# Patient Record
Sex: FEMALE | Race: ASIAN | NOT HISPANIC OR LATINO | URBAN - METROPOLITAN AREA
[De-identification: names, ages, dates, MRNs, and addresses within clinical notes are randomized per-mention and may not be internally consistent; named-entity substitution may affect disease eponyms.]

---

## 2017-06-23 ENCOUNTER — INPATIENT (INPATIENT)
Facility: HOSPITAL | Age: 47
LOS: 1 days | Discharge: HOME | DRG: 690 | End: 2017-06-25
Attending: INTERNAL MEDICINE | Admitting: INTERNAL MEDICINE
Payer: COMMERCIAL

## 2017-06-23 VITALS
SYSTOLIC BLOOD PRESSURE: 112 MMHG | TEMPERATURE: 100 F | OXYGEN SATURATION: 96 % | HEART RATE: 106 BPM | RESPIRATION RATE: 20 BRPM | DIASTOLIC BLOOD PRESSURE: 75 MMHG

## 2017-06-23 LAB
ALBUMIN SERPL ELPH-MCNC: 4.4 G/DL — SIGNIFICANT CHANGE UP (ref 3.3–5)
ALP SERPL-CCNC: 45 U/L — SIGNIFICANT CHANGE UP (ref 40–120)
ALT FLD-CCNC: 16 U/L RC — SIGNIFICANT CHANGE UP (ref 10–45)
ANION GAP SERPL CALC-SCNC: 14 MMOL/L — SIGNIFICANT CHANGE UP (ref 5–17)
AST SERPL-CCNC: 23 U/L — SIGNIFICANT CHANGE UP (ref 10–40)
BASOPHILS # BLD AUTO: 0 K/UL — SIGNIFICANT CHANGE UP (ref 0–0.2)
BASOPHILS NFR BLD AUTO: 0.3 % — SIGNIFICANT CHANGE UP (ref 0–2)
BILIRUB SERPL-MCNC: 0.8 MG/DL — SIGNIFICANT CHANGE UP (ref 0.2–1.2)
BUN SERPL-MCNC: 7 MG/DL — SIGNIFICANT CHANGE UP (ref 7–23)
CALCIUM SERPL-MCNC: 9.6 MG/DL — SIGNIFICANT CHANGE UP (ref 8.4–10.5)
CHLORIDE SERPL-SCNC: 99 MMOL/L — SIGNIFICANT CHANGE UP (ref 96–108)
CO2 SERPL-SCNC: 25 MMOL/L — SIGNIFICANT CHANGE UP (ref 22–31)
CREAT SERPL-MCNC: 0.78 MG/DL — SIGNIFICANT CHANGE UP (ref 0.5–1.3)
EOSINOPHIL # BLD AUTO: 0 K/UL — SIGNIFICANT CHANGE UP (ref 0–0.5)
EOSINOPHIL NFR BLD AUTO: 0.3 % — SIGNIFICANT CHANGE UP (ref 0–6)
GAS PNL BLDV: SIGNIFICANT CHANGE UP
GLUCOSE SERPL-MCNC: 100 MG/DL — HIGH (ref 70–99)
HCG UR QL: NEGATIVE — SIGNIFICANT CHANGE UP
HCT VFR BLD CALC: 40.1 % — SIGNIFICANT CHANGE UP (ref 34.5–45)
HGB BLD-MCNC: 13.5 G/DL — SIGNIFICANT CHANGE UP (ref 11.5–15.5)
LIDOCAIN IGE QN: 27 U/L — SIGNIFICANT CHANGE UP (ref 7–60)
LYMPHOCYTES # BLD AUTO: 1 K/UL — SIGNIFICANT CHANGE UP (ref 1–3.3)
LYMPHOCYTES # BLD AUTO: 14.6 % — SIGNIFICANT CHANGE UP (ref 13–44)
MCHC RBC-ENTMCNC: 29.9 PG — SIGNIFICANT CHANGE UP (ref 27–34)
MCHC RBC-ENTMCNC: 33.5 GM/DL — SIGNIFICANT CHANGE UP (ref 32–36)
MCV RBC AUTO: 89.1 FL — SIGNIFICANT CHANGE UP (ref 80–100)
MONOCYTES # BLD AUTO: 0.4 K/UL — SIGNIFICANT CHANGE UP (ref 0–0.9)
MONOCYTES NFR BLD AUTO: 5.7 % — SIGNIFICANT CHANGE UP (ref 2–14)
NEUTROPHILS # BLD AUTO: 5.4 K/UL — SIGNIFICANT CHANGE UP (ref 1.8–7.4)
NEUTROPHILS NFR BLD AUTO: 79.2 % — HIGH (ref 43–77)
PLATELET # BLD AUTO: 144 K/UL — LOW (ref 150–400)
POTASSIUM SERPL-MCNC: 3.4 MMOL/L — LOW (ref 3.5–5.3)
POTASSIUM SERPL-SCNC: 3.4 MMOL/L — LOW (ref 3.5–5.3)
PROT SERPL-MCNC: 7.7 G/DL — SIGNIFICANT CHANGE UP (ref 6–8.3)
RBC # BLD: 4.5 M/UL — SIGNIFICANT CHANGE UP (ref 3.8–5.2)
RBC # FLD: 11.7 % — SIGNIFICANT CHANGE UP (ref 10.3–14.5)
SODIUM SERPL-SCNC: 138 MMOL/L — SIGNIFICANT CHANGE UP (ref 135–145)
WBC # BLD: 6.8 K/UL — SIGNIFICANT CHANGE UP (ref 3.8–10.5)
WBC # FLD AUTO: 6.8 K/UL — SIGNIFICANT CHANGE UP (ref 3.8–10.5)

## 2017-06-23 PROCEDURE — 71020: CPT | Mod: 26

## 2017-06-23 PROCEDURE — 74177 CT ABD & PELVIS W/CONTRAST: CPT | Mod: 26

## 2017-06-23 PROCEDURE — 99285 EMERGENCY DEPT VISIT HI MDM: CPT

## 2017-06-23 RX ORDER — SODIUM CHLORIDE 9 MG/ML
1000 INJECTION INTRAMUSCULAR; INTRAVENOUS; SUBCUTANEOUS ONCE
Qty: 0 | Refills: 0 | Status: COMPLETED | OUTPATIENT
Start: 2017-06-23 | End: 2017-06-23

## 2017-06-23 RX ORDER — ACETAMINOPHEN 500 MG
1000 TABLET ORAL ONCE
Qty: 0 | Refills: 0 | Status: COMPLETED | OUTPATIENT
Start: 2017-06-23 | End: 2017-06-23

## 2017-06-23 RX ORDER — CEFTRIAXONE 500 MG/1
1 INJECTION, POWDER, FOR SOLUTION INTRAMUSCULAR; INTRAVENOUS ONCE
Qty: 0 | Refills: 0 | Status: COMPLETED | OUTPATIENT
Start: 2017-06-23 | End: 2017-06-23

## 2017-06-23 RX ADMIN — CEFTRIAXONE 100 GRAM(S): 500 INJECTION, POWDER, FOR SOLUTION INTRAMUSCULAR; INTRAVENOUS at 23:28

## 2017-06-23 RX ADMIN — SODIUM CHLORIDE 1000 MILLILITER(S): 9 INJECTION INTRAMUSCULAR; INTRAVENOUS; SUBCUTANEOUS at 22:51

## 2017-06-23 RX ADMIN — Medication 400 MILLIGRAM(S): at 22:51

## 2017-06-23 NOTE — ED PROVIDER NOTE - MEDICAL DECISION MAKING DETAILS
47F with likely R sided pyelonephritis, RLQ tender will get ctap ro appendicitis; cxr ro pna given cough and fever x 2 weeks; labs tylenol ua reassess. -prewalter lacey 47F with likely R sided pyelonephritis, RLQ tender will get ctap ro appendicitis; cxr ro pna given cough and fever x 2 weeks; labs tylenol ua reassess. - ZR

## 2017-06-23 NOTE — ED ADULT NURSE NOTE - OBJECTIVE STATEMENT
Patient AXOX3, present in the ED with  at side c/ o r sided lower back pain associated with intermittent fever. went to primary care had a catr scan done and primary care dr told her to come to ED based on the result of her cat scan.   Pt reports body aches, and severe R lower back pain now radiating to RLQ.  No nausea/vomiting.  No diarrhea.  No dysuria. Patient respiration even unlabored, lung field clear bilaterally. plan of care explained to patient support and safety provided. will monitor.

## 2017-06-23 NOTE — ED PROVIDER NOTE - PROGRESS NOTE DETAILS
Private doctor Dr Deny Dutton  - Spoke with private doctor Dr Deny Dutton  --> [phone] case discussed - says to admit to full time in house hospitalist not private attending. -magdiel lacey

## 2017-06-23 NOTE — ED PROVIDER NOTE - OBJECTIVE STATEMENT
47F bib  sent in from urgent care after renal US showed "emergent finding" - pt reports she had yeast infection a month ago, self treated with 2 days over the counter cream; since then developed wet nonproductive cough, chest congestion, intermittent subjective fever never took temperature, and r sided lower back pain; went to pmd who checked urine and sent pt home, over last 2 days r lower back pain became severe,  today even more severe went to urgent care where she had us of kidneys done, radiologist called  physician who sent pt into ED.  Pt reports body aches, and severe R lower back pain now radiating to RLQ.  No nausea/vomiting.  No diarrhea.  No dysuria.

## 2017-06-24 DIAGNOSIS — N12 TUBULO-INTERSTITIAL NEPHRITIS, NOT SPECIFIED AS ACUTE OR CHRONIC: ICD-10-CM

## 2017-06-24 DIAGNOSIS — R11.2 NAUSEA WITH VOMITING, UNSPECIFIED: ICD-10-CM

## 2017-06-24 DIAGNOSIS — Z29.9 ENCOUNTER FOR PROPHYLACTIC MEASURES, UNSPECIFIED: ICD-10-CM

## 2017-06-24 DIAGNOSIS — R50.9 FEVER, UNSPECIFIED: ICD-10-CM

## 2017-06-24 LAB
APPEARANCE UR: CLEAR — SIGNIFICANT CHANGE UP
BILIRUB UR-MCNC: NEGATIVE — SIGNIFICANT CHANGE UP
COLOR SPEC: SIGNIFICANT CHANGE UP
COMMENT - URINE: SIGNIFICANT CHANGE UP
CULTURE RESULTS: NO GROWTH — SIGNIFICANT CHANGE UP
DIFF PNL FLD: NEGATIVE — SIGNIFICANT CHANGE UP
GLUCOSE UR QL: NEGATIVE — SIGNIFICANT CHANGE UP
KETONES UR-MCNC: ABNORMAL
LEUKOCYTE ESTERASE UR-ACNC: ABNORMAL
NITRITE UR-MCNC: NEGATIVE — SIGNIFICANT CHANGE UP
PH UR: 6.5 — SIGNIFICANT CHANGE UP (ref 5–8)
PROT UR-MCNC: NEGATIVE — SIGNIFICANT CHANGE UP
RBC CASTS # UR COMP ASSIST: SIGNIFICANT CHANGE UP /HPF (ref 0–2)
SP GR SPEC: 1.01 — LOW (ref 1.01–1.02)
SPECIMEN SOURCE: SIGNIFICANT CHANGE UP
UROBILINOGEN FLD QL: NEGATIVE — SIGNIFICANT CHANGE UP
WBC UR QL: SIGNIFICANT CHANGE UP /HPF (ref 0–5)

## 2017-06-24 PROCEDURE — 99223 1ST HOSP IP/OBS HIGH 75: CPT

## 2017-06-24 RX ORDER — CEFTRIAXONE 500 MG/1
1 INJECTION, POWDER, FOR SOLUTION INTRAMUSCULAR; INTRAVENOUS ONCE
Qty: 0 | Refills: 0 | Status: COMPLETED | OUTPATIENT
Start: 2017-06-24 | End: 2017-06-24

## 2017-06-24 RX ORDER — SODIUM CHLORIDE 9 MG/ML
1000 INJECTION, SOLUTION INTRAVENOUS ONCE
Qty: 0 | Refills: 0 | Status: COMPLETED | OUTPATIENT
Start: 2017-06-24 | End: 2017-06-24

## 2017-06-24 RX ORDER — SODIUM CHLORIDE 9 MG/ML
1000 INJECTION INTRAMUSCULAR; INTRAVENOUS; SUBCUTANEOUS
Qty: 0 | Refills: 0 | Status: COMPLETED | OUTPATIENT
Start: 2017-06-24 | End: 2017-06-24

## 2017-06-24 RX ORDER — ONDANSETRON 8 MG/1
4 TABLET, FILM COATED ORAL ONCE
Qty: 0 | Refills: 0 | Status: COMPLETED | OUTPATIENT
Start: 2017-06-24 | End: 2017-06-24

## 2017-06-24 RX ORDER — FAMOTIDINE 10 MG/ML
20 INJECTION INTRAVENOUS DAILY
Qty: 0 | Refills: 0 | Status: DISCONTINUED | OUTPATIENT
Start: 2017-06-24 | End: 2017-06-25

## 2017-06-24 RX ORDER — ACETAMINOPHEN 500 MG
650 TABLET ORAL EVERY 6 HOURS
Qty: 0 | Refills: 0 | Status: DISCONTINUED | OUTPATIENT
Start: 2017-06-24 | End: 2017-06-25

## 2017-06-24 RX ORDER — HEPARIN SODIUM 5000 [USP'U]/ML
5000 INJECTION INTRAVENOUS; SUBCUTANEOUS EVERY 12 HOURS
Qty: 0 | Refills: 0 | Status: DISCONTINUED | OUTPATIENT
Start: 2017-06-24 | End: 2017-06-25

## 2017-06-24 RX ORDER — ACETAMINOPHEN 500 MG
650 TABLET ORAL ONCE
Qty: 0 | Refills: 0 | Status: COMPLETED | OUTPATIENT
Start: 2017-06-24 | End: 2017-06-24

## 2017-06-24 RX ORDER — SODIUM CHLORIDE 9 MG/ML
1000 INJECTION INTRAMUSCULAR; INTRAVENOUS; SUBCUTANEOUS ONCE
Qty: 0 | Refills: 0 | Status: COMPLETED | OUTPATIENT
Start: 2017-06-24 | End: 2017-06-24

## 2017-06-24 RX ORDER — ONDANSETRON 8 MG/1
4 TABLET, FILM COATED ORAL EVERY 8 HOURS
Qty: 0 | Refills: 0 | Status: DISCONTINUED | OUTPATIENT
Start: 2017-06-24 | End: 2017-06-25

## 2017-06-24 RX ORDER — KETOROLAC TROMETHAMINE 30 MG/ML
15 SYRINGE (ML) INJECTION ONCE
Qty: 0 | Refills: 0 | Status: DISCONTINUED | OUTPATIENT
Start: 2017-06-24 | End: 2017-06-24

## 2017-06-24 RX ORDER — CEFTRIAXONE 500 MG/1
INJECTION, POWDER, FOR SOLUTION INTRAMUSCULAR; INTRAVENOUS
Qty: 0 | Refills: 0 | Status: DISCONTINUED | OUTPATIENT
Start: 2017-06-24 | End: 2017-06-25

## 2017-06-24 RX ORDER — CEFTRIAXONE 500 MG/1
1 INJECTION, POWDER, FOR SOLUTION INTRAMUSCULAR; INTRAVENOUS EVERY 24 HOURS
Qty: 0 | Refills: 0 | Status: DISCONTINUED | OUTPATIENT
Start: 2017-06-25 | End: 2017-06-25

## 2017-06-24 RX ORDER — SODIUM CHLORIDE 9 MG/ML
1000 INJECTION INTRAMUSCULAR; INTRAVENOUS; SUBCUTANEOUS ONCE
Qty: 0 | Refills: 0 | Status: DISCONTINUED | OUTPATIENT
Start: 2017-06-24 | End: 2017-06-24

## 2017-06-24 RX ADMIN — ONDANSETRON 4 MILLIGRAM(S): 8 TABLET, FILM COATED ORAL at 16:22

## 2017-06-24 RX ADMIN — SODIUM CHLORIDE 1000 MILLILITER(S): 9 INJECTION, SOLUTION INTRAVENOUS at 05:00

## 2017-06-24 RX ADMIN — Medication 650 MILLIGRAM(S): at 09:19

## 2017-06-24 RX ADMIN — SODIUM CHLORIDE 1000 MILLILITER(S): 9 INJECTION INTRAMUSCULAR; INTRAVENOUS; SUBCUTANEOUS at 00:56

## 2017-06-24 RX ADMIN — SODIUM CHLORIDE 1000 MILLILITER(S): 9 INJECTION INTRAMUSCULAR; INTRAVENOUS; SUBCUTANEOUS at 02:53

## 2017-06-24 RX ADMIN — FAMOTIDINE 20 MILLIGRAM(S): 10 INJECTION INTRAVENOUS at 12:09

## 2017-06-24 RX ADMIN — Medication 650 MILLIGRAM(S): at 10:00

## 2017-06-24 RX ADMIN — Medication 650 MILLIGRAM(S): at 05:00

## 2017-06-24 RX ADMIN — SODIUM CHLORIDE 75 MILLILITER(S): 9 INJECTION INTRAMUSCULAR; INTRAVENOUS; SUBCUTANEOUS at 06:28

## 2017-06-24 RX ADMIN — Medication 650 MILLIGRAM(S): at 16:22

## 2017-06-24 RX ADMIN — Medication 650 MILLIGRAM(S): at 17:00

## 2017-06-24 RX ADMIN — Medication 15 MILLIGRAM(S): at 03:23

## 2017-06-24 RX ADMIN — CEFTRIAXONE 100 GRAM(S): 500 INJECTION, POWDER, FOR SOLUTION INTRAMUSCULAR; INTRAVENOUS at 09:16

## 2017-06-24 RX ADMIN — ONDANSETRON 4 MILLIGRAM(S): 8 TABLET, FILM COATED ORAL at 09:17

## 2017-06-24 RX ADMIN — Medication 1000 MILLIGRAM(S): at 00:56

## 2017-06-24 RX ADMIN — HEPARIN SODIUM 5000 UNIT(S): 5000 INJECTION INTRAVENOUS; SUBCUTANEOUS at 18:19

## 2017-06-24 RX ADMIN — SODIUM CHLORIDE 75 MILLILITER(S): 9 INJECTION INTRAMUSCULAR; INTRAVENOUS; SUBCUTANEOUS at 18:49

## 2017-06-24 NOTE — PROVIDER CONTACT NOTE (OTHER) - BACKGROUND
Pt has been intermittently febrile prior to coming to ED, recd 1 g tylenol in ED. Pt recd 3 LNS Bolus

## 2017-06-24 NOTE — ED ADULT NURSE REASSESSMENT NOTE - NS ED NURSE REASSESS COMMENT FT1
Patient BP 89/60, patient receiving fluids, Patient mentating well. MD advised of patient BP, will continue to monitor. safety and support provided.

## 2017-06-24 NOTE — H&P ADULT - NEGATIVE CARDIOVASCULAR SYMPTOMS
no dyspnea on exertion/no peripheral edema/no chest pain/no palpitations/no orthopnea/no paroxysmal nocturnal dyspnea

## 2017-06-24 NOTE — H&P ADULT - ASSESSMENT
47F with h/o recurrent UTI who presents with c/o right sided lower back pain , fever/chills and nausea c 2 days who was sent to the ED by her PMD for Renal US finding of right pyelonephritis. Physical exam is notable for right costovertebral tenderness. Labs are negative for leukocytosis, UA shows mildly elevated LE. CT abd/pelvis demonstrates right pyelonephritis.

## 2017-06-24 NOTE — H&P ADULT - HISTORY OF PRESENT ILLNESS
47F with no significant past medical hx who presents with c/o right sided lower back pain x 2 days. She reports associated fever/chills and nausea. No vomiting but reports poor appetite. Pain is sharp, intermittent, aggravated by lying supine and coughing. It radiates to the RLQ. She denies hematuria, frequency, dysuria.   She also c/o nonproductive cough and chest congestion. Denies CP, SOB, sick contacts.  Pt initially presented to her PMD who did a UA and sent her home on PO antibiotics. Reported worsening of right lower back pain and subsequently went to Urgent Care where Renal US was done. Renal US demonstrated right pyelonephritis and pt was instructed by PMD to go to the ED for further evaluation and management.  Currently pt reports pain is 5/10, relieved by Tylenol. She continues to have fever/chills and nausea. Also c/o feeling bloated.    ED course  VS : 110/65  100 18 T99.1 O2 98% on room air  Labs: wbc 6.8 bun/creat 7/0.78  UA : LE (small) , nitrite (neg)  Treatment : IVF LR 1L x 1 ,Ceftriaxone 1 g x 1, Ketorolac 15 mg IVP x 1, Tylenol IV 1g x 1 47F with h/o recurrent UTI who presents with c/o right sided lower back pain x 2 days. She reports associated fever/chills and nausea. No vomiting but reports poor appetite. Pain is sharp, intermittent, aggravated by lying supine and coughing. It radiates to the RLQ. She denies hematuria, frequency, dysuria.   She also c/o nonproductive cough and chest congestion. Denies CP, SOB, sick contacts.  Pt initially presented to her PMD who did a UA and sent her home on PO antibiotics. Reported worsening of right lower back pain and subsequently went to Urgent Care where Renal US was done. Renal US demonstrated right pyelonephritis and pt was instructed by PMD to go to the ED for further evaluation and management.  Currently pt reports pain is 5/10, relieved by Tylenol. She continues to have fever/chills and nausea. Also c/o feeling bloated.    ED course  VS : 110/65  100 18 T99.1 O2 98% on room air  Labs: wbc 6.8 bun/creat 7/0.78  UA : LE (small) , nitrite (neg)  Treatment : IVF LR 1L x 1 ,Ceftriaxone 1 g x 1, Ketorolac 15 mg IVP x 1, Tylenol IV 1g x 1

## 2017-06-24 NOTE — ED ADULT NURSE REASSESSMENT NOTE - NS ED NURSE REASSESS COMMENT FT1
Patient in no distress mentating well. will monitor. Patient in no distress mentating well patient stated this is her baseline BP, per patient her blood patient is always low. will monitor.

## 2017-06-24 NOTE — H&P ADULT - NSHPLABSRESULTS_GEN_ALL_CORE
Urinalysis + Microscopic Examination (06.23.17 @ 23:28)    Ketone - Urine: Small    Urine Appearance: Clear    Color: PL Yellow    Protein, Urine: Negative    Specific Gravity: 1.006    Bilirubin: Negative    Glucose Qualitative, Urine: Negative    Leukocyte Esterase Concentration: Small    Nitrite: Negative    Blood, Urine: Negative    pH Urine: 6.5    Urobilinogen: Negative    Red Blood Cell - Urine: 0-2 /HPF    White Blood Cell - Urine: 6-10 /HPF    Comment - Urine: Few Mucus Strands    Blood Gas Venous - Lactate: 1.7 mmoL/L (06.23.17 @ 23:00)    Complete Blood Count + Automated Diff (06.23.17 @ 23:00)    WBC Count: 6.8 K/uL    RBC Count: 4.50 M/uL    Hemoglobin: 13.5 g/dL    Hematocrit: 40.1 %    Mean Cell Volume: 89.1 fl    Mean Cell Hemoglobin: 29.9 pg    Mean Cell Hemoglobin Conc: 33.5 gm/dL    Red Cell Distrib Width: 11.7 %    Platelet Count - Automated: 144 K/uL    Auto Neutrophil #: 5.4 K/uL    Auto Lymphocyte #: 1.0 K/uL    Auto Monocyte #: 0.4 K/uL    Auto Eosinophil #: 0.0 K/uL    Auto Basophil #: 0.0 K/uL    Auto Neutrophil %: 79.2:     CT ABDOMEN AND PELVIS OC IC          There are cortical enhancement defect in the upper pole   of the right kidney and mild asymmetric right-sided perinephric   stranding. There is no right or left hydronephrosis. The left kidney   enhances normally.    IMPRESSION:        Findings consistent with right-sided pyelonephritis.    CXR: grossly clear lungs Urinalysis + Microscopic Examination (06.23.17 @ 23:28)    Ketone - Urine: Small    Urine Appearance: Clear    Color: PL Yellow    Protein, Urine: Negative    Specific Gravity: 1.006    Bilirubin: Negative    Glucose Qualitative, Urine: Negative    Leukocyte Esterase Concentration: Small    Nitrite: Negative    Blood, Urine: Negative    pH Urine: 6.5    Urobilinogen: Negative    Red Blood Cell - Urine: 0-2 /HPF    White Blood Cell - Urine: 6-10 /HPF    Comment - Urine: Few Mucus Strands    Blood Gas Venous - Lactate: 1.7 mmoL/L (06.23.17 @ 23:00)    Complete Blood Count + Automated Diff (06.23.17 @ 23:00)    WBC Count: 6.8 K/uL    RBC Count: 4.50 M/uL    Hemoglobin: 13.5 g/dL    Hematocrit: 40.1 %    Mean Cell Volume: 89.1 fl    Mean Cell Hemoglobin: 29.9 pg    Mean Cell Hemoglobin Conc: 33.5 gm/dL    Red Cell Distrib Width: 11.7 %    Platelet Count - Automated: 144 K/uL    Auto Neutrophil #: 5.4 K/uL    Auto Lymphocyte #: 1.0 K/uL    Auto Monocyte #: 0.4 K/uL    Auto Eosinophil #: 0.0 K/uL    Auto Basophil #: 0.0 K/uL    Auto Neutrophil %: 79.2:     CT ABDOMEN AND PELVIS OC IC          There are cortical enhancement defect in the upper pole   of the right kidney and mild asymmetric right-sided perinephric   stranding. There is no right or left hydronephrosis. The left kidney   enhances normally.    IMPRESSION:        Findings consistent with right-sided pyelonephritis.    CXR: personally reviewed; grossly clear lungs

## 2017-06-25 VITALS
DIASTOLIC BLOOD PRESSURE: 59 MMHG | RESPIRATION RATE: 18 BRPM | TEMPERATURE: 98 F | HEART RATE: 66 BPM | OXYGEN SATURATION: 99 % | SYSTOLIC BLOOD PRESSURE: 95 MMHG

## 2017-06-25 LAB
ANION GAP SERPL CALC-SCNC: 13 MMOL/L — SIGNIFICANT CHANGE UP (ref 5–17)
BUN SERPL-MCNC: 6 MG/DL — LOW (ref 7–23)
CALCIUM SERPL-MCNC: 7.8 MG/DL — LOW (ref 8.4–10.5)
CHLORIDE SERPL-SCNC: 107 MMOL/L — SIGNIFICANT CHANGE UP (ref 96–108)
CO2 SERPL-SCNC: 19 MMOL/L — LOW (ref 22–31)
CREAT SERPL-MCNC: 0.64 MG/DL — SIGNIFICANT CHANGE UP (ref 0.5–1.3)
GLUCOSE SERPL-MCNC: 102 MG/DL — HIGH (ref 70–99)
HCT VFR BLD CALC: 30.8 % — LOW (ref 34.5–45)
HGB BLD-MCNC: 10.3 G/DL — LOW (ref 11.5–15.5)
MCHC RBC-ENTMCNC: 28.4 PG — SIGNIFICANT CHANGE UP (ref 27–34)
MCHC RBC-ENTMCNC: 33.4 GM/DL — SIGNIFICANT CHANGE UP (ref 32–36)
MCV RBC AUTO: 84.8 FL — SIGNIFICANT CHANGE UP (ref 80–100)
PLATELET # BLD AUTO: 116 K/UL — LOW (ref 150–400)
POTASSIUM SERPL-MCNC: 3.4 MMOL/L — LOW (ref 3.5–5.3)
POTASSIUM SERPL-SCNC: 3.4 MMOL/L — LOW (ref 3.5–5.3)
RBC # BLD: 3.63 M/UL — LOW (ref 3.8–5.2)
RBC # FLD: 13.1 % — SIGNIFICANT CHANGE UP (ref 10.3–14.5)
SODIUM SERPL-SCNC: 139 MMOL/L — SIGNIFICANT CHANGE UP (ref 135–145)
WBC # BLD: 5.97 K/UL — SIGNIFICANT CHANGE UP (ref 3.8–10.5)
WBC # FLD AUTO: 5.97 K/UL — SIGNIFICANT CHANGE UP (ref 3.8–10.5)

## 2017-06-25 PROCEDURE — 82435 ASSAY OF BLOOD CHLORIDE: CPT

## 2017-06-25 PROCEDURE — 82330 ASSAY OF CALCIUM: CPT

## 2017-06-25 PROCEDURE — 96375 TX/PRO/DX INJ NEW DRUG ADDON: CPT

## 2017-06-25 PROCEDURE — 71046 X-RAY EXAM CHEST 2 VIEWS: CPT

## 2017-06-25 PROCEDURE — 82803 BLOOD GASES ANY COMBINATION: CPT

## 2017-06-25 PROCEDURE — 96374 THER/PROPH/DIAG INJ IV PUSH: CPT

## 2017-06-25 PROCEDURE — 80053 COMPREHEN METABOLIC PANEL: CPT

## 2017-06-25 PROCEDURE — 74177 CT ABD & PELVIS W/CONTRAST: CPT

## 2017-06-25 PROCEDURE — 83690 ASSAY OF LIPASE: CPT

## 2017-06-25 PROCEDURE — 83605 ASSAY OF LACTIC ACID: CPT

## 2017-06-25 PROCEDURE — 81001 URINALYSIS AUTO W/SCOPE: CPT

## 2017-06-25 PROCEDURE — 99239 HOSP IP/OBS DSCHRG MGMT >30: CPT

## 2017-06-25 PROCEDURE — 87086 URINE CULTURE/COLONY COUNT: CPT

## 2017-06-25 PROCEDURE — 87040 BLOOD CULTURE FOR BACTERIA: CPT

## 2017-06-25 PROCEDURE — 81025 URINE PREGNANCY TEST: CPT

## 2017-06-25 PROCEDURE — 85027 COMPLETE CBC AUTOMATED: CPT

## 2017-06-25 PROCEDURE — 82947 ASSAY GLUCOSE BLOOD QUANT: CPT

## 2017-06-25 PROCEDURE — 85014 HEMATOCRIT: CPT

## 2017-06-25 PROCEDURE — 99285 EMERGENCY DEPT VISIT HI MDM: CPT | Mod: 25

## 2017-06-25 PROCEDURE — 84132 ASSAY OF SERUM POTASSIUM: CPT

## 2017-06-25 PROCEDURE — 84295 ASSAY OF SERUM SODIUM: CPT

## 2017-06-25 PROCEDURE — 80048 BASIC METABOLIC PNL TOTAL CA: CPT

## 2017-06-25 RX ORDER — FAMOTIDINE 10 MG/ML
1 INJECTION INTRAVENOUS
Qty: 30 | Refills: 0 | OUTPATIENT
Start: 2017-06-25 | End: 2017-07-25

## 2017-06-25 RX ADMIN — CEFTRIAXONE 100 GRAM(S): 500 INJECTION, POWDER, FOR SOLUTION INTRAMUSCULAR; INTRAVENOUS at 08:53

## 2017-06-25 RX ADMIN — HEPARIN SODIUM 5000 UNIT(S): 5000 INJECTION INTRAVENOUS; SUBCUTANEOUS at 06:06

## 2017-06-25 NOTE — DISCHARGE NOTE ADULT - MEDICATION SUMMARY - MEDICATIONS TO TAKE
I will START or STAY ON the medications listed below when I get home from the hospital:    Levaquin 750 mg oral tablet  -- 1 tab(s) by mouth every 24 hours  -- Indication: For Pyelonephritis

## 2017-06-25 NOTE — PROGRESS NOTE ADULT - SUBJECTIVE AND OBJECTIVE BOX
Patient is a 47y old  Female who presents with a chief complaint of right flank pain, nausea, fever (2017 12:10)      SUBJECTIVE / OVERNIGHT EVENTS: No acute events overnight. Reports marked improvement in right flank pain. Denies fever/chills/nausea/vomiting. No abd pain /dysuria/hematuria.    MEDICATIONS  (STANDING):  cefTRIAXone   IVPB  IV Intermittent   cefTRIAXone   IVPB 1Gram(s) IV Intermittent every 24 hours  heparin  Injectable 5000Unit(s) SubCutaneous every 12 hours  famotidine    Tablet 20milliGRAM(s) Oral daily    MEDICATIONS  (PRN):  ondansetron Injectable 4milliGRAM(s) IV Push every 8 hours PRN Nausea and/or Vomiting  acetaminophen   Tablet. 650milliGRAM(s) Oral every 6 hours PRN Mild Pain (1 - 3)  acetaminophen   Tablet 650milliGRAM(s) Oral every 6 hours PRN For Temp greater than 38 C (100.4 F)      Vital Signs Last 24 Hrs  T(C): 36.9, Max: 37.2 ( @ 04:05)  HR: 66 (66 - 77)  BP: 95/59 (95/59 - 102/67)  RR: 18 (18 - 18)  SpO2: 99% (97% - 99%)  Wt(kg): --  CAPILLARY BLOOD GLUCOSE    I&O's Summary  I & Os for 24h ending 2017 07:00  =============================================  IN: 1020 ml / OUT: 3 ml / NET: 1017 ml    I & Os for current day (as of 2017 15:16)  =============================================  IN: 740 ml / OUT: 0 ml / NET: 740 ml      PHYSICAL EXAM:  GENERAL: NAD, well-developed  HEAD:  Atraumatic, Normocephalic  EYES: EOMI, PERRLA, conjunctiva and sclera clear  NECK: Supple, No JVD  CHEST/LUNG: Clear to auscultation bilaterally; No wheeze  HEART: Regular rate and rhythm; No murmurs, rubs, or gallops  ABDOMEN: Soft, Nontender, Nondistended; Bowel sounds present  EXTREMITIES:  2+ Peripheral Pulses, No clubbing, cyanosis, or edema  PSYCH: AAOx3  NEUROLOGY: non-focal  SKIN: No rashes or lesions    LABS:                        10.3   5.97  )-----------( 116      ( 2017 08:41 )             30.8     -    139  |  107  |  6<L>  ----------------------------<  102<H>  3.4<L>   |  19<L>  |  0.64    Ca    7.8<L>      2017 08:43    TPro  7.7  /  Alb  4.4  /  TBili  0.8  /  DBili  x   /  AST  23  /  ALT  16  /  AlkPhos  45            Urinalysis Basic - ( 2017 23:28 )    Color: PL Yellow / Appearance: Clear / S.006 / pH: x  Gluc: x / Ketone: Small  / Bili: Negative / Urobili: Negative   Blood: x / Protein: Negative / Nitrite: Negative   Leuk Esterase: Small / RBC: 0-2 /HPF / WBC 6-10 /HPF   Sq Epi: x / Non Sq Epi: x / Bacteria: x    2017  Bld Cx : NGTD  Urine Cx : NGTD    RADIOLOGY & ADDITIONAL TESTS:    Imaging Personally Reviewed:    Consultant(s) Notes Reviewed:      Care Discussed with Consultants/Other Providers:

## 2017-06-25 NOTE — PROGRESS NOTE ADULT - PROBLEM SELECTOR PLAN 1
s/p 3 doses Ceftriaxone  Bld and Urine Cx are negative to date.  Will discharge home on PO Levaquin to complete 10 days abx

## 2017-06-25 NOTE — DISCHARGE NOTE ADULT - CARE PLAN
Principal Discharge DX:	Pyelonephritis  Goal:	Resolution of symptom  Instructions for follow-up, activity and diet:	Continue current antibiotic X 7 days

## 2017-06-25 NOTE — DISCHARGE NOTE ADULT - PATIENT PORTAL LINK FT
“You can access the FollowHealth Patient Portal, offered by [facility], by registering with the following website: [email or web address]”

## 2017-06-25 NOTE — PROGRESS NOTE ADULT - ASSESSMENT
47F with h/o recurrent UTI admitted for Acute Pyelonephritis. Today reports marked improvement right flank pain.  She is s/p 3 doses of Ceftriaxone. Bld and Urine Cx are negative to date.

## 2017-06-25 NOTE — DISCHARGE NOTE ADULT - HOSPITAL COURSE
MD 47F with h/o recurrent UTI who presented with c/o right sided lower back pain , fever/chills and nausea x 2 days . She was sent to the ED by her PMD for Renal US finding of right pyelonephritis. Her physical exam at presentation was  notable for right costovertebral tenderness. Labs were negative for leukocytosis, UA shows mildly elevated LE. CT abd/pelvis demonstrates right pyelonephritis.She was started on Ceftriaxone 1 g q 24 with improvement in symptoms. She has received 3 doses. Blood and urine cultures were negative. She was discharged on Levofloxacin 750mg PO qd x 7 days. She is to follow up with PMD within 1 week of discharge.

## 2017-06-29 LAB
CULTURE RESULTS: SIGNIFICANT CHANGE UP
CULTURE RESULTS: SIGNIFICANT CHANGE UP
SPECIMEN SOURCE: SIGNIFICANT CHANGE UP
SPECIMEN SOURCE: SIGNIFICANT CHANGE UP

## 2020-03-20 ENCOUNTER — EMERGENCY (EMERGENCY)
Facility: HOSPITAL | Age: 50
LOS: 1 days | Discharge: HOME | End: 2020-03-20
Attending: EMERGENCY MEDICINE
Payer: COMMERCIAL

## 2020-03-20 VITALS
DIASTOLIC BLOOD PRESSURE: 67 MMHG | RESPIRATION RATE: 16 BRPM | OXYGEN SATURATION: 100 % | SYSTOLIC BLOOD PRESSURE: 100 MMHG | TEMPERATURE: 98 F | HEART RATE: 64 BPM

## 2020-03-20 VITALS
SYSTOLIC BLOOD PRESSURE: 110 MMHG | HEIGHT: 66 IN | RESPIRATION RATE: 18 BRPM | TEMPERATURE: 98 F | WEIGHT: 128.09 LBS | OXYGEN SATURATION: 95 % | DIASTOLIC BLOOD PRESSURE: 66 MMHG | HEART RATE: 74 BPM

## 2020-03-20 PROBLEM — N39.0 URINARY TRACT INFECTION, SITE NOT SPECIFIED: Chronic | Status: ACTIVE | Noted: 2017-06-23

## 2020-03-20 LAB
ALBUMIN SERPL ELPH-MCNC: 4.3 G/DL — SIGNIFICANT CHANGE UP (ref 3.3–5)
ALP SERPL-CCNC: 46 U/L — SIGNIFICANT CHANGE UP (ref 40–120)
ALT FLD-CCNC: 18 U/L — SIGNIFICANT CHANGE UP (ref 10–45)
ANION GAP SERPL CALC-SCNC: 11 MMOL/L — SIGNIFICANT CHANGE UP (ref 5–17)
APPEARANCE UR: CLEAR — SIGNIFICANT CHANGE UP
AST SERPL-CCNC: 33 U/L — SIGNIFICANT CHANGE UP (ref 10–40)
BASOPHILS # BLD AUTO: 0.01 K/UL — SIGNIFICANT CHANGE UP (ref 0–0.2)
BASOPHILS NFR BLD AUTO: 0.4 % — SIGNIFICANT CHANGE UP (ref 0–2)
BILIRUB SERPL-MCNC: 0.2 MG/DL — SIGNIFICANT CHANGE UP (ref 0.2–1.2)
BILIRUB UR-MCNC: NEGATIVE — SIGNIFICANT CHANGE UP
BUN SERPL-MCNC: 7 MG/DL — SIGNIFICANT CHANGE UP (ref 7–23)
CALCIUM SERPL-MCNC: 9.3 MG/DL — SIGNIFICANT CHANGE UP (ref 8.4–10.5)
CHLORIDE SERPL-SCNC: 102 MMOL/L — SIGNIFICANT CHANGE UP (ref 96–108)
CO2 SERPL-SCNC: 25 MMOL/L — SIGNIFICANT CHANGE UP (ref 22–31)
COLOR SPEC: SIGNIFICANT CHANGE UP
CREAT SERPL-MCNC: 0.58 MG/DL — SIGNIFICANT CHANGE UP (ref 0.5–1.3)
DIFF PNL FLD: NEGATIVE — SIGNIFICANT CHANGE UP
EOSINOPHIL # BLD AUTO: 0.01 K/UL — SIGNIFICANT CHANGE UP (ref 0–0.5)
EOSINOPHIL NFR BLD AUTO: 0.4 % — SIGNIFICANT CHANGE UP (ref 0–6)
GAS PNL BLDV: SIGNIFICANT CHANGE UP
GLUCOSE SERPL-MCNC: 86 MG/DL — SIGNIFICANT CHANGE UP (ref 70–99)
GLUCOSE UR QL: NEGATIVE — SIGNIFICANT CHANGE UP
HCT VFR BLD CALC: 38.1 % — SIGNIFICANT CHANGE UP (ref 34.5–45)
HGB BLD-MCNC: 12.1 G/DL — SIGNIFICANT CHANGE UP (ref 11.5–15.5)
KETONES UR-MCNC: SIGNIFICANT CHANGE UP
LEUKOCYTE ESTERASE UR-ACNC: NEGATIVE — SIGNIFICANT CHANGE UP
LYMPHOCYTES # BLD AUTO: 0.99 K/UL — LOW (ref 1–3.3)
LYMPHOCYTES # BLD AUTO: 36.1 % — SIGNIFICANT CHANGE UP (ref 13–44)
MAGNESIUM SERPL-MCNC: 1.9 MG/DL — SIGNIFICANT CHANGE UP (ref 1.6–2.6)
MCHC RBC-ENTMCNC: 28 PG — SIGNIFICANT CHANGE UP (ref 27–34)
MCHC RBC-ENTMCNC: 31.8 GM/DL — LOW (ref 32–36)
MCV RBC AUTO: 88.2 FL — SIGNIFICANT CHANGE UP (ref 80–100)
MONOCYTES # BLD AUTO: 0.26 K/UL — SIGNIFICANT CHANGE UP (ref 0–0.9)
MONOCYTES NFR BLD AUTO: 9.5 % — SIGNIFICANT CHANGE UP (ref 2–14)
NEUTROPHILS # BLD AUTO: 1.47 K/UL — LOW (ref 1.8–7.4)
NEUTROPHILS NFR BLD AUTO: 53.6 % — SIGNIFICANT CHANGE UP (ref 43–77)
NITRITE UR-MCNC: NEGATIVE — SIGNIFICANT CHANGE UP
NRBC # BLD: 0 /100 WBCS — SIGNIFICANT CHANGE UP (ref 0–0)
PH UR: 8 — SIGNIFICANT CHANGE UP (ref 5–8)
PHOSPHATE SERPL-MCNC: 3.1 MG/DL — SIGNIFICANT CHANGE UP (ref 2.5–4.5)
PLATELET # BLD AUTO: 93 K/UL — LOW (ref 150–400)
POTASSIUM SERPL-MCNC: 4.9 MMOL/L — SIGNIFICANT CHANGE UP (ref 3.5–5.3)
POTASSIUM SERPL-SCNC: 4.9 MMOL/L — SIGNIFICANT CHANGE UP (ref 3.5–5.3)
PROT SERPL-MCNC: 7.1 G/DL — SIGNIFICANT CHANGE UP (ref 6–8.3)
PROT UR-MCNC: NEGATIVE — SIGNIFICANT CHANGE UP
RBC # BLD: 4.32 M/UL — SIGNIFICANT CHANGE UP (ref 3.8–5.2)
RBC # FLD: 13 % — SIGNIFICANT CHANGE UP (ref 10.3–14.5)
SODIUM SERPL-SCNC: 138 MMOL/L — SIGNIFICANT CHANGE UP (ref 135–145)
SP GR SPEC: 1.01 — SIGNIFICANT CHANGE UP (ref 1.01–1.02)
UROBILINOGEN FLD QL: NEGATIVE — SIGNIFICANT CHANGE UP
WBC # BLD: 2.74 K/UL — LOW (ref 3.8–10.5)
WBC # FLD AUTO: 2.74 K/UL — LOW (ref 3.8–10.5)

## 2020-03-20 PROCEDURE — 84100 ASSAY OF PHOSPHORUS: CPT

## 2020-03-20 PROCEDURE — 82435 ASSAY OF BLOOD CHLORIDE: CPT

## 2020-03-20 PROCEDURE — 81003 URINALYSIS AUTO W/O SCOPE: CPT

## 2020-03-20 PROCEDURE — 85014 HEMATOCRIT: CPT

## 2020-03-20 PROCEDURE — 83605 ASSAY OF LACTIC ACID: CPT

## 2020-03-20 PROCEDURE — 84295 ASSAY OF SERUM SODIUM: CPT

## 2020-03-20 PROCEDURE — 99284 EMERGENCY DEPT VISIT MOD MDM: CPT | Mod: 25

## 2020-03-20 PROCEDURE — 96374 THER/PROPH/DIAG INJ IV PUSH: CPT | Mod: XU

## 2020-03-20 PROCEDURE — 82330 ASSAY OF CALCIUM: CPT

## 2020-03-20 PROCEDURE — 87086 URINE CULTURE/COLONY COUNT: CPT

## 2020-03-20 PROCEDURE — 82803 BLOOD GASES ANY COMBINATION: CPT

## 2020-03-20 PROCEDURE — 80053 COMPREHEN METABOLIC PANEL: CPT

## 2020-03-20 PROCEDURE — 99285 EMERGENCY DEPT VISIT HI MDM: CPT

## 2020-03-20 PROCEDURE — 82947 ASSAY GLUCOSE BLOOD QUANT: CPT

## 2020-03-20 PROCEDURE — 85027 COMPLETE CBC AUTOMATED: CPT

## 2020-03-20 PROCEDURE — 74177 CT ABD & PELVIS W/CONTRAST: CPT

## 2020-03-20 PROCEDURE — 83735 ASSAY OF MAGNESIUM: CPT

## 2020-03-20 PROCEDURE — 74177 CT ABD & PELVIS W/CONTRAST: CPT | Mod: 26

## 2020-03-20 PROCEDURE — 84132 ASSAY OF SERUM POTASSIUM: CPT

## 2020-03-20 RX ORDER — SODIUM CHLORIDE 9 MG/ML
1000 INJECTION INTRAMUSCULAR; INTRAVENOUS; SUBCUTANEOUS ONCE
Refills: 0 | Status: COMPLETED | OUTPATIENT
Start: 2020-03-20 | End: 2020-03-20

## 2020-03-20 RX ORDER — KETOROLAC TROMETHAMINE 30 MG/ML
15 SYRINGE (ML) INJECTION ONCE
Refills: 0 | Status: DISCONTINUED | OUTPATIENT
Start: 2020-03-20 | End: 2020-03-20

## 2020-03-20 RX ADMIN — Medication 15 MILLIGRAM(S): at 06:58

## 2020-03-20 RX ADMIN — SODIUM CHLORIDE 1000 MILLILITER(S): 9 INJECTION INTRAMUSCULAR; INTRAVENOUS; SUBCUTANEOUS at 06:58

## 2020-03-20 NOTE — ED ADULT NURSE REASSESSMENT NOTE - NS ED NURSE REASSESS COMMENT FT1
Hand off report received from RN. Pt AAOx4, NAD, resp nonlabored, resting comfortably in bed with family at bedside. Pt denies headache, dizziness, chest pain, palpitations, SOB, n/v/d, urinary symptoms, fevers, chills, weakness at this time. Pt awaiting CT . Safety maintained.

## 2020-03-20 NOTE — ED PROVIDER NOTE - OBJECTIVE STATEMENT
49 y/o F PMH recurrent UTI, pyelonephritis in 2017, presenting with 5 days of progressively worsening R flank pain associated with intermittent fevers (Tmax 102.4F at home) and chills, and nausea. Pain slightly exacerbated by movement, no relieving factors. She denies any HA, vision changes, neck pain, chest pain, cough, SOB, abd pain, vomiting, diarrhea, dysuria/hematuria, joint pain/swelling or rashes. Notes symptoms feel similar to prior pyelonephritis. Has not taken anything for the pain today. Was going to wait to be evaluated by PMD but as pain worsening came to the ER for evaluation. No recent abx use. No hx of kidney stones.

## 2020-03-20 NOTE — ED ADULT NURSE NOTE - OBJECTIVE STATEMENT
50 yr old F arrived to the ED from home c/o back pain and low grade fever x1 week. HX pyonephritis. pt states the pain started on one side but is now lexy and she has nausea and earlier tonight her R leg felt numb. upon assessment pt is A&ox4, speaking clearly. answering questions and following commands. pt is moving all extremities and now denies and numbness in her lower extremities. lugs clear lexy. respirations are even and unlabored. abd is soft, non tender. non distended. pt denies and urinary symptoms, fever, chills. chest pain or sob.

## 2020-03-20 NOTE — ED PROVIDER NOTE - PATIENT PORTAL LINK FT
You can access the FollowMyHealth Patient Portal offered by [facility] by registering at the following website: [email or web address]. By joining [employer]’s FollowMyHealth portal, you will also be able to view your health information using other applications (apps) compatible with our system.

## 2020-03-20 NOTE — ED ADULT NURSE REASSESSMENT NOTE - NS ED NURSE REASSESS COMMENT FT1
Report received from Suzanne THMOAS in [facility]. Patient seen resting comfortably in bed. Waiting for patient to urinate to collect specimen to send to lab. Awaiting CT. Safety and comfort provided.

## 2020-03-20 NOTE — ED PROVIDER NOTE - ATTENDING CONTRIBUTION TO CARE
Pt here w R flank pain, fevers, chills. no resp symtpoms. no abd pain. exam benign besides vital signs. will require w/u to r/o pyelonephritis vs kidney stone, systemic infection. signed out to oncoming team with plan to f/u w/u as above.

## 2020-03-20 NOTE — ED PROVIDER NOTE - PHYSICAL EXAMINATION
PHYSICAL EXAM:  GENERAL: Sitting in stretcher, appears uncomfortable due to pain, in no acute distress  HENMT: Atraumatic, moist mucous membranes, no oropharyngeal exudates or vesicles, uvula is midline  EYES: Clear bilaterally, PERRL, EOMs intact b/l  HEART: RRR, S1/S2, no murmurs noted  RESPIRATORY: Clear to auscultation bilaterally, no wheezes/rhonchi/rales  ABDOMEN: +BS, soft, nontender, nondistended  BACK: mild R CVA tenderness, no midline spinal tenderness  EXTREMITIES: No lower extremity edema, +2 radial pulses b/l  NEURO:  A&Ox4, no focal motor deficits or sensory deficits   Heme/LYMPH: No ecchymosis or bruising, no anterior/posterior cervical or supraclavicular LAD  SKIN:  Skin normal color for race, warm, dry and intact. No evidence of rash.

## 2020-03-20 NOTE — ED PROVIDER NOTE - CLINICAL SUMMARY MEDICAL DECISION MAKING FREE TEXT BOX
51 y/o F PMH recurrent UTI, pyelonephritis in 2017, presenting with R flank pain, fevers/chills and nausea. Clinical presentation and exam concerning for possible pyelo, no respiratory symptoms. Will obtain basic labs, UA/culture, proceed with IVF, toradol for pain control. If UA negative will consider CT A/P w/o contrast for stone hunt.

## 2020-03-20 NOTE — ED PROVIDER NOTE - NS ED MD DISPO DISCHARGE
Onset: 2020   Location/description: had a bm 2020; changed from Similac [employer] Comfort to Gurdeep Goodstart for gas/soothing 2020 1900 (states she changed because it seemed like she was uncomfortable with gas)  Grunting and straining while eating, starts to eat but then strains and stretches out legs and stops eating  Denies fever, sunken in soft spot or dry mouth    Associated Symptoms: see above  What improves/worsens symptoms:   Symptom specific medications: Gripe water and gas drops  Intake and Output: less than 1 ounce every 3-4 hrs since 0900 2020; ate 1.5 ounces at 2215  Activity level: some difficulty   Temperature (route and time): denies    Weight:   Wt Readings from Last 1 Encounters:   09/05/20 3.12 kg (26 %, Z= -0.64)*     * Growth percentiles are based on WHO (Girls, 0-2 years) data.        Recent Care: 2020 peds  Did the patient have a positive coronavirus screening?: No    PLAN:  Paged Provider    Caller agrees to follow recommendations.      Dr. Nathan called back and was updated with above information  Advised to try taking rectal temp, \"bicycle legs\" to help with gas/constipation  If pt's mother is very concerned, take to ED, otherwise call [facility] to schedule appt on 2020    Pt's mother called and updated with MD advisement    [facility] Protocols and Care Advice reviewed.    Susan verbalizes understanding. Instructed to call again with any changes in status or with new questions.       Home

## 2020-03-20 NOTE — ED PROVIDER NOTE - NS ED ROS FT
Constitutional: (+) fever and chills  Eyes: no discharge, no irritation, no pain, no visual changes  ENMT: no ear pain or hearing loss, no dysphagia or throat pain  Neck: no pain, no stiffness, no swollen glands  CV: no chest pain, no palpitations, no edema  Resp: no cough, no shortness of breath  Abd: no abdominal pain, (+) nausea, no vomiting, no diarrhea  : (+) R flank pain, no dysuria, no hematuria  MSK: no neck pain, no joint pain  Neuro: no LOC, no gait abnormality, no headache, no sensory deficits, no weakness  Skin: no rashes, no lacerations, no lesions

## 2020-03-21 LAB
CULTURE RESULTS: SIGNIFICANT CHANGE UP
SPECIMEN SOURCE: SIGNIFICANT CHANGE UP

## 2022-02-27 ENCOUNTER — EMERGENCY (EMERGENCY)
Facility: HOSPITAL | Age: 52
LOS: 1 days | Discharge: HOME | End: 2022-02-27
Attending: STUDENT IN AN ORGANIZED HEALTH CARE EDUCATION/TRAINING PROGRAM
Payer: COMMERCIAL

## 2022-02-27 VITALS
OXYGEN SATURATION: 100 % | RESPIRATION RATE: 18 BRPM | TEMPERATURE: 98 F | WEIGHT: 128.97 LBS | HEART RATE: 59 BPM | HEIGHT: 66 IN | DIASTOLIC BLOOD PRESSURE: 69 MMHG | SYSTOLIC BLOOD PRESSURE: 137 MMHG

## 2022-02-27 VITALS
DIASTOLIC BLOOD PRESSURE: 68 MMHG | HEART RATE: 69 BPM | RESPIRATION RATE: 18 BRPM | TEMPERATURE: 98 F | SYSTOLIC BLOOD PRESSURE: 107 MMHG | OXYGEN SATURATION: 98 %

## 2022-02-27 LAB
ALBUMIN SERPL ELPH-MCNC: 5 G/DL — SIGNIFICANT CHANGE UP (ref 3.3–5)
ALP SERPL-CCNC: 58 U/L — SIGNIFICANT CHANGE UP (ref 40–120)
ALT FLD-CCNC: 21 U/L — SIGNIFICANT CHANGE UP (ref 10–45)
ANION GAP SERPL CALC-SCNC: 12 MMOL/L — SIGNIFICANT CHANGE UP (ref 5–17)
APPEARANCE UR: CLEAR — SIGNIFICANT CHANGE UP
AST SERPL-CCNC: 25 U/L — SIGNIFICANT CHANGE UP (ref 10–40)
BACTERIA # UR AUTO: NEGATIVE — SIGNIFICANT CHANGE UP
BASE EXCESS BLDV CALC-SCNC: 5.5 MMOL/L — HIGH (ref -2–2)
BASOPHILS # BLD AUTO: 0.02 K/UL — SIGNIFICANT CHANGE UP (ref 0–0.2)
BASOPHILS NFR BLD AUTO: 0.5 % — SIGNIFICANT CHANGE UP (ref 0–2)
BILIRUB SERPL-MCNC: 0.3 MG/DL — SIGNIFICANT CHANGE UP (ref 0.2–1.2)
BILIRUB UR-MCNC: NEGATIVE — SIGNIFICANT CHANGE UP
BUN SERPL-MCNC: 8 MG/DL — SIGNIFICANT CHANGE UP (ref 7–23)
CA-I SERPL-SCNC: 1.31 MMOL/L — SIGNIFICANT CHANGE UP (ref 1.15–1.33)
CALCIUM SERPL-MCNC: 10.5 MG/DL — SIGNIFICANT CHANGE UP (ref 8.4–10.5)
CHLORIDE BLDV-SCNC: 106 MMOL/L — SIGNIFICANT CHANGE UP (ref 96–108)
CHLORIDE SERPL-SCNC: 106 MMOL/L — SIGNIFICANT CHANGE UP (ref 96–108)
CO2 BLDV-SCNC: 33 MMOL/L — HIGH (ref 22–26)
CO2 SERPL-SCNC: 25 MMOL/L — SIGNIFICANT CHANGE UP (ref 22–31)
COLOR SPEC: COLORLESS — SIGNIFICANT CHANGE UP
CREAT SERPL-MCNC: 0.69 MG/DL — SIGNIFICANT CHANGE UP (ref 0.5–1.3)
DIFF PNL FLD: NEGATIVE — SIGNIFICANT CHANGE UP
EOSINOPHIL # BLD AUTO: 0.06 K/UL — SIGNIFICANT CHANGE UP (ref 0–0.5)
EOSINOPHIL NFR BLD AUTO: 1.6 % — SIGNIFICANT CHANGE UP (ref 0–6)
EPI CELLS # UR: 1 /HPF — SIGNIFICANT CHANGE UP
GAS PNL BLDV: 140 MMOL/L — SIGNIFICANT CHANGE UP (ref 136–145)
GAS PNL BLDV: SIGNIFICANT CHANGE UP
GLUCOSE BLDV-MCNC: 77 MG/DL — SIGNIFICANT CHANGE UP (ref 70–99)
GLUCOSE SERPL-MCNC: 80 MG/DL — SIGNIFICANT CHANGE UP (ref 70–99)
GLUCOSE UR QL: NEGATIVE — SIGNIFICANT CHANGE UP
HCG UR QL: NEGATIVE — SIGNIFICANT CHANGE UP
HCO3 BLDV-SCNC: 31 MMOL/L — HIGH (ref 22–29)
HCT VFR BLD CALC: 41 % — SIGNIFICANT CHANGE UP (ref 34.5–45)
HCT VFR BLDA CALC: 41 % — SIGNIFICANT CHANGE UP (ref 34.5–46.5)
HGB BLD CALC-MCNC: 13.7 G/DL — SIGNIFICANT CHANGE UP (ref 11.7–16.1)
HGB BLD-MCNC: 13.3 G/DL — SIGNIFICANT CHANGE UP (ref 11.5–15.5)
IMM GRANULOCYTES NFR BLD AUTO: 0.3 % — SIGNIFICANT CHANGE UP (ref 0–1.5)
KETONES UR-MCNC: NEGATIVE — SIGNIFICANT CHANGE UP
LACTATE BLDV-MCNC: 1.3 MMOL/L — SIGNIFICANT CHANGE UP (ref 0.7–2)
LEUKOCYTE ESTERASE UR-ACNC: ABNORMAL
LYMPHOCYTES # BLD AUTO: 1.99 K/UL — SIGNIFICANT CHANGE UP (ref 1–3.3)
LYMPHOCYTES # BLD AUTO: 53.4 % — HIGH (ref 13–44)
MCHC RBC-ENTMCNC: 28.1 PG — SIGNIFICANT CHANGE UP (ref 27–34)
MCHC RBC-ENTMCNC: 32.4 GM/DL — SIGNIFICANT CHANGE UP (ref 32–36)
MCV RBC AUTO: 86.7 FL — SIGNIFICANT CHANGE UP (ref 80–100)
MONOCYTES # BLD AUTO: 0.23 K/UL — SIGNIFICANT CHANGE UP (ref 0–0.9)
MONOCYTES NFR BLD AUTO: 6.2 % — SIGNIFICANT CHANGE UP (ref 2–14)
NEUTROPHILS # BLD AUTO: 1.42 K/UL — LOW (ref 1.8–7.4)
NEUTROPHILS NFR BLD AUTO: 38 % — LOW (ref 43–77)
NITRITE UR-MCNC: NEGATIVE — SIGNIFICANT CHANGE UP
NRBC # BLD: 0 /100 WBCS — SIGNIFICANT CHANGE UP (ref 0–0)
PCO2 BLDV: 48 MMHG — HIGH (ref 39–42)
PH BLDV: 7.42 — SIGNIFICANT CHANGE UP (ref 7.32–7.43)
PH UR: 8 — SIGNIFICANT CHANGE UP (ref 5–8)
PLATELET # BLD AUTO: 181 K/UL — SIGNIFICANT CHANGE UP (ref 150–400)
PO2 BLDV: 22 MMHG — LOW (ref 25–45)
POTASSIUM BLDV-SCNC: 3.7 MMOL/L — SIGNIFICANT CHANGE UP (ref 3.5–5.1)
POTASSIUM SERPL-MCNC: 4 MMOL/L — SIGNIFICANT CHANGE UP (ref 3.5–5.3)
POTASSIUM SERPL-SCNC: 4 MMOL/L — SIGNIFICANT CHANGE UP (ref 3.5–5.3)
PROT SERPL-MCNC: 7.5 G/DL — SIGNIFICANT CHANGE UP (ref 6–8.3)
PROT UR-MCNC: NEGATIVE — SIGNIFICANT CHANGE UP
RBC # BLD: 4.73 M/UL — SIGNIFICANT CHANGE UP (ref 3.8–5.2)
RBC # FLD: 13.1 % — SIGNIFICANT CHANGE UP (ref 10.3–14.5)
RBC CASTS # UR COMP ASSIST: 2 /HPF — SIGNIFICANT CHANGE UP (ref 0–4)
SAO2 % BLDV: 35.4 % — LOW (ref 67–88)
SODIUM SERPL-SCNC: 143 MMOL/L — SIGNIFICANT CHANGE UP (ref 135–145)
SP GR SPEC: 1.01 — LOW (ref 1.01–1.02)
UROBILINOGEN FLD QL: NEGATIVE — SIGNIFICANT CHANGE UP
WBC # BLD: 3.73 K/UL — LOW (ref 3.8–10.5)
WBC # FLD AUTO: 3.73 K/UL — LOW (ref 3.8–10.5)
WBC UR QL: 3 /HPF — SIGNIFICANT CHANGE UP (ref 0–5)

## 2022-02-27 PROCEDURE — 82330 ASSAY OF CALCIUM: CPT

## 2022-02-27 PROCEDURE — 99243 OFF/OP CNSLTJ NEW/EST LOW 30: CPT | Mod: 1L,GC

## 2022-02-27 PROCEDURE — 99284 EMERGENCY DEPT VISIT MOD MDM: CPT | Mod: 25

## 2022-02-27 PROCEDURE — 76856 US EXAM PELVIC COMPLETE: CPT

## 2022-02-27 PROCEDURE — 80053 COMPREHEN METABOLIC PANEL: CPT

## 2022-02-27 PROCEDURE — 99285 EMERGENCY DEPT VISIT HI MDM: CPT

## 2022-02-27 PROCEDURE — 82435 ASSAY OF BLOOD CHLORIDE: CPT

## 2022-02-27 PROCEDURE — 96374 THER/PROPH/DIAG INJ IV PUSH: CPT | Mod: XU

## 2022-02-27 PROCEDURE — 87040 BLOOD CULTURE FOR BACTERIA: CPT

## 2022-02-27 PROCEDURE — 74177 CT ABD & PELVIS W/CONTRAST: CPT | Mod: 26,MA

## 2022-02-27 PROCEDURE — 81025 URINE PREGNANCY TEST: CPT

## 2022-02-27 PROCEDURE — 76856 US EXAM PELVIC COMPLETE: CPT | Mod: 26,59

## 2022-02-27 PROCEDURE — 83605 ASSAY OF LACTIC ACID: CPT

## 2022-02-27 PROCEDURE — 84295 ASSAY OF SERUM SODIUM: CPT

## 2022-02-27 PROCEDURE — 84443 ASSAY THYROID STIM HORMONE: CPT

## 2022-02-27 PROCEDURE — 74177 CT ABD & PELVIS W/CONTRAST: CPT | Mod: MA

## 2022-02-27 PROCEDURE — 87086 URINE CULTURE/COLONY COUNT: CPT

## 2022-02-27 PROCEDURE — 85018 HEMOGLOBIN: CPT

## 2022-02-27 PROCEDURE — 85025 COMPLETE CBC W/AUTO DIFF WBC: CPT

## 2022-02-27 PROCEDURE — 93975 VASCULAR STUDY: CPT

## 2022-02-27 PROCEDURE — 81001 URINALYSIS AUTO W/SCOPE: CPT

## 2022-02-27 PROCEDURE — 84132 ASSAY OF SERUM POTASSIUM: CPT

## 2022-02-27 PROCEDURE — 93975 VASCULAR STUDY: CPT | Mod: 26

## 2022-02-27 PROCEDURE — 85014 HEMATOCRIT: CPT

## 2022-02-27 PROCEDURE — 82947 ASSAY GLUCOSE BLOOD QUANT: CPT

## 2022-02-27 PROCEDURE — 36415 COLL VENOUS BLD VENIPUNCTURE: CPT

## 2022-02-27 PROCEDURE — 82803 BLOOD GASES ANY COMBINATION: CPT

## 2022-02-27 RX ORDER — ACETAMINOPHEN 500 MG
1000 TABLET ORAL ONCE
Refills: 0 | Status: COMPLETED | OUTPATIENT
Start: 2022-02-27 | End: 2022-02-27

## 2022-02-27 RX ORDER — SODIUM CHLORIDE 9 MG/ML
1000 INJECTION INTRAMUSCULAR; INTRAVENOUS; SUBCUTANEOUS ONCE
Refills: 0 | Status: COMPLETED | OUTPATIENT
Start: 2022-02-27 | End: 2022-02-27

## 2022-02-27 RX ADMIN — Medication 1000 MILLIGRAM(S): at 22:38

## 2022-02-27 RX ADMIN — SODIUM CHLORIDE 1000 MILLILITER(S): 9 INJECTION INTRAMUSCULAR; INTRAVENOUS; SUBCUTANEOUS at 18:24

## 2022-02-27 RX ADMIN — Medication 400 MILLIGRAM(S): at 18:20

## 2022-02-27 NOTE — ED PROVIDER NOTE - PHYSICAL EXAMINATION
NAD, VSS, Afebrile, Lungs clear. ABD soft, non tender. +B/L diffuse CVA tender (R>L). +Trace peripheral edema. Neuro- intact.

## 2022-02-27 NOTE — ED PROVIDER NOTE - NSFOLLOWUPINSTRUCTIONS_ED_ALL_ED_FT
1. You were seen in the Emergency Room for possible kidney infection and ovarian mass  2 You may take ACETAMINOPHEN (650 mg every 6 hours) and IBUPROFEN (400-600 mg every 6 hours) as directed on packaging. Always follow medication instructions and read medication side effects. Stop using these medications if you have any concerns. If pain is severe, you may take Tylenol and Motrin at the same time  3. Please follow up with your GYN doctor in 24-48 hours (Dr. Dumont) for your ovarian mass for further testing and blood work (discuss the need to order the following labs - CA-19, CEA, Inhibin, AFP, Hcg)  4. Return to the emergency room or seek immediate assistance for any new or concerning symptoms (such as fevers, chills, worsening pain, change in bowel or bladder habits, unable to urinate or have a bowel movement ), or if you get worse.   5. Copies of your tests were provided to you for follow-up.  You must address all your findings with your doctor.     CONTINUE THE CEFDINIR AS PRESCRIBED    Please see below for information to follow up with a urologist. You should call to make an appointment in 24-48 hours. Someone should also call you to help you make an appointment.

## 2022-02-27 NOTE — ED PROVIDER NOTE - PATIENT PORTAL LINK FT
You can access the FollowMyHealth Patient Portal offered by [facility] by registering at the following website: [email or web address]. By joining [employer]’s FollowMyHealth portal, you will also be able to view your health information using other applications (apps) compatible with our system.

## 2022-02-27 NOTE — ED PROVIDER NOTE - OBJECTIVE STATEMENT
52yo female pt with PMHx of UTIs (once a year in 5years) presents to ED with b/l flank pain and chills. Pt stated she had dysuria with frequent urination one month ago and evaluated by PMD Dr. Roz Smith. She completed 2 7-day- course of Cipro without improvement and now on Omnicef since yesterday. She felt progressively worsening b/l flank pain with intermittent chills for few days and noticed swelling to b/l hand and leg for 2-3days. Denies fever, cough, or congestion. Denies N/V/D. Denies ABD pain. Denies CP/SOB. Denies sensory change 50yo female pt with PMHx of UTIs (once a year in 5years), Ovarian Cyst presents to ED with b/l flank pain and chills. Pt stated she had dysuria with frequent urination one month ago and evaluated by PMD Dr. Roz Smith. She completed 2 7-day- course of Cipro without improvement and now on Omnicef since yesterday. She felt progressively worsening b/l flank pain with intermittent chills for few days and noticed swelling to b/l hand and leg for 2-3days. Denies fever, cough, or congestion. Denies N/V/D. Denies ABD pain. Denies CP/SOB. Denies sensory change or weakness to extremities. Denies vaginal discharge or bleeding. 50yo female pt with PMHx of UTIs (once a year in 5years), Left Ovarian Cyst presents to ED with b/l flank pain and chills. Pt stated she had dysuria with frequent urination one month ago and evaluated by PMD Dr. Roz Smith. She completed 2 7-day- course of Cipro without improvement and now on Omnicef since yesterday. She felt progressively worsening b/l flank pain with intermittent chills for few days and noticed swelling to b/l hand and leg for 2-3days. Denies fever, cough, or congestion. Denies N/V/D. Denies ABD pain. Denies CP/SOB. Denies sensory change or weakness to extremities. Denies vaginal discharge or bleeding.

## 2022-02-27 NOTE — ED PROVIDER NOTE - PROGRESS NOTE DETAILS
Pt stated feeling better and went to CT. Fátima De La Cruz MD Attending Physician- CT and US results appreciated. Discussed case with OBGYN and results discussed with patient. Patient has obgyn onc surgery appt wednesday which she will follow up. pain improved, tolerating PO. Recommended continuing PO abx prescribed outpatient (cefdinir 300 BID x 14 days, tx for pyelo), hosp will call if ucx not sensitive. will give uro follow up,

## 2022-02-27 NOTE — CONSULT NOTE ADULT - ATTENDING COMMENTS
Patient presents with complaints of right sided flank pain and occ left lower quadrant pain. Patient with history of recurrent UTI and pyelonephritis. Patient reports this back pain has been present for the last 1 month with occ chills. Denies nausea, vomiting, and fevers.   Reports history of left adnexal mass with scheduled appointment with Dr. Dumont at [facility] this week. Reports the LLQ discomfort is not bothering her as much as her right back.   Reports taking Tylenol with some relief.   Past medical and surgical history reviewed. Denies medication allergies.   Vitals reviewed   Gen: no acute distress   Back: ++ right CVA tenderness   Abd: minimal LLQ tenderness, no rebound or guarding noted   Ext: no calf tenderness  Labs and imaging reviewed   A/P: Known hx of left adnexal mass, no acute intervention required   -f/u for scheduled appointment with Dr. Dumont   -return precautions reviewed  -tumor markers to be done with GYN oncology   -ED to evaluate flank pain and assess for possible UTI     MIGUELINA Singh

## 2022-02-27 NOTE — ED ADULT NURSE REASSESSMENT NOTE - NS ED NURSE REASSESS COMMENT FT1
Received report from WILLIAM Garcia. DEYSI. Patient awaiting CT scan. Safety measures maintained. Bed in the lowest position. Call bell within reach.  Provider at the bedside. No acute distress noted or further complaints at this time.

## 2022-02-27 NOTE — ED PROVIDER NOTE - NSFOLLOWUPCLINICS_GEN_ALL_ED_FT
[facility] - Urology  Urology  [address], [address] & [address]th [place] [address]  Phone: [phone]  Fax:   Follow Up Time: 1-3 Days

## 2022-02-27 NOTE — ED ADULT NURSE NOTE - OBJECTIVE STATEMENT
Pt is a 52 yo F who came to the ED amb c/o lexy flank pain. States she has a UTI  x 1month and has been on 3 course of ABX with no improvement. +chills, +hematuria, no n/v/d. A/O x3.

## 2022-02-27 NOTE — ED PROVIDER NOTE - ATTENDING CONTRIBUTION TO CARE
I performed a history and physical exam of the patient and discussed their management with the resident and /or advanced care provider. I reviewed the resident and /or ACP's note and agree with the documented findings and plan of care except where otherwise noted. My medical decision making and observations are found below     50 yo F pmh 9 cm R ovarian cyst, recurrent UTI, hx pyelonephritis p/w 1 month of b/l back pain and lower abdominal pain. 1 month ago was prescribed cipro course, repeated on 2/22, changed to cefdinir 2/26 but only took 1 dose and noticed pain was worsening. Also endorses 2-3 days of worsening pain, b/l hand/foot swelling and chills. Patient is able to communicate in English    PHYSICAL EXAM:   General: well-appearing, appears stated age, not in extremis   HEENT: NC/AT, airway patent  Cardiovascular: regular rate   Respiratory:  nonlabored respirations  Abdominal: soft, +LLQ TTP, nondistended, no rebound, guarding or rigidity  Back: +mild R CVAT  Extremities: mild nonpitting edema to b/l LE and TTP of shins, +mild b/l wrist edema without tenderness  Neuro: Awake, alert, interactive  Psychiatric: appropriate mood and affect.   -Fátima De La Cruz MD Attending Physician    MDM: hx and physical as noted above. ddx includes but not limited to  UTI vs pyelo vs ovarian torsion vs abdominal pathology such as diverticulitis/colitis. Labs, UA, CT/US. dispo pending

## 2022-02-27 NOTE — CONSULT NOTE ADULT - SUBJECTIVE AND OBJECTIVE BOX
GYN Consult Note    HPI:  51y  postmenopausal female presents with right lower back pain x1 month. Patient reports that she started her 3rd course of antibiotics for UTIs yesterday. She states that her dysuria improved after the first course but she still reports increased urinary frequency. She reports intermittent chills. She denies fevers, headaches, CP, SOB, abdominal pain, edema, N/V.     OB/GYN HISTORY:    x4 (97, 99, 05, 09)    Last Menstrual Period: 5 yrs ago     Name of Physician: Dr. Smith PMD     PAST MEDICAL & SURGICAL HISTORY:  UTI (urinary tract infection)  No significant past surgical history    REVIEW OF SYSTEMS  General: denies fevers, chills, tiredness  Skin/Breast: denies breast pain  Respiratory and Thorax: denies shortness of breath, denies cough  Cardiovascular: denies chest pain and denies palpitations  Gastrointestinal: denies abdominal pain, nausea/ vomiting	  Genitourinary: +increased urinary frequency, Right flank pain denies dysuria, urgency	  Constitutional, Cardiovascular, Respiratory, Gastrointestinal, Genitourinary, Musculoskeletal and Integumentary review of systems are normal except as noted. 	    MEDICATIONS: Denies    Allergies  No Known Allergies    SOCIAL HISTORY:  Denies toxic habits, anxiety and depression       Vital Signs Last 24 Hrs  T(C): 36.7 (2022 22:36), Max: 36.7 (2022 16:14)  T(F): 98 (2022 22:36), Max: 98.1 (2022 16:14)  HR: 69 (2022 22:36) (59 - 69)  BP: 107/68 (2022 22:36) (100/63 - 137/69)  BP(mean): 81 (2022 22:36) (75 - 81)  RR: 18 (2022 22:36) (18 - 20)  SpO2: 98% (2022 22:36) (98% - 100%)    PHYSICAL EXAM:   Gen: NAD, alert and oriented x 3  Cardiovascular: regular   Respiratory: breathing comfortably on RA  Abd: soft, non tender, non-distended, Right CVA tenderness  Pelvic: closed/long, no CMT, Uterus: normal size, non tender  Adnexa: non tender, no palpable masses  Extremities: NTBL  Skin: warm and well perfused      LABS:                        13.3   3.73  )-----------( 181      ( 2022 18:12 )             41.0         143  |  106  |  8   ----------------------------<  80  4.0   |  25  |  0.69    Ca    10.5      2022 18:12    TPro  7.5  /  Alb  5.0  /  TBili  0.3  /  DBili  x   /  AST  25  /  ALT  21  /  AlkPhos  58        Urinalysis Basic - ( 2022 18:34 )    Color: Colorless / Appearance: Clear / S.007 / pH: x  Gluc: x / Ketone: Negative  / Bili: Negative / Urobili: Negative   Blood: x / Protein: Negative / Nitrite: Negative   Leuk Esterase: Moderate / RBC: 2 /hpf / WBC 3 /HPF   Sq Epi: x / Non Sq Epi: 1 /hpf / Bacteria: Negative        RADIOLOGY & ADDITIONAL STUDIES:    < from: CT Abdomen and Pelvis w/ IV Cont (22 @ 20:08) >  IMPRESSION:    Indeterminate 9.9 x 2.5 cm left adnexal mass. Normal left ovary not   identified. Correlation with pelvic ultrasound or MRI is recommended.    < end of copied text >    < from: US Doppler Pelvis (22 @ 21:16) >  IMPRESSION:    1. Large vascular left adnexal mass measuring up to 9.6 x 8.1 x 8.0 cm.   Normal ovary not identified. Correlation with contrast-enhanced MRI and   gynecology surgical consult is recommended.  2. Debris within the urinary bladder. Recommend correlation with   urinalysis for cystitis.      < end of copied text >   Comment: Moderate papular acne, previously cleared on antibiotics.  I suggested seysara and wean to oracea if possible. Detail Level: Detailed

## 2022-02-27 NOTE — CONSULT NOTE ADULT - ASSESSMENT
51y  postmenopausal female presents with right lower back pain x1 month. Patient reports that she started her 3rd course of antibiotics for UTIs yesterday. Upon arrival to the ED VS wnl, WBC wnl, PE remarkable for right CVA tenderness and imaging concerning for left adnexal mass 9.6 x 8.1 x 8.0cm in size    - continue Tylenol for pain control   - : 14 (wnl) on 2022 outpatient  - appointment on Wednesday with GYN Oncologist Dr. Areli Dumont per patient   - recommend sending tumor markers CA-19, CEA, Inhibin, AFP, Hcg either in the hospital today or on Wednesday with outpatient GYN Oncologist   - no acute GYN intervention   - management of urinary complaints per ED    seen and d/w Dr. Francisco Partida PGY2 51y  postmenopausal female presents with right lower back pain x1 month. Patient reports that she started her 3rd course of antibiotics for UTIs yesterday. Upon arrival to the ED VS wnl, WBC wnl, PE remarkable for right CVA tenderness and imaging concerning for left adnexal mass 9.6 x 8.1 x 8.0cm in size. Differential includes malignancy versus benign mass, difficult to determine at this time. Low suspicion for torsion at this time given well-appearing patient with overall benign examination.     - continue Tylenol for pain control   - : 14 (wnl) on 2022 outpatient  - appointment on Wednesday with GYN Oncologist Dr. Areli Dumont per patient   - recommend sending tumor markers CA-19, CEA, Inhibin, AFP, Hcg either in the hospital today or on Wednesday with outpatient GYN Oncologist   - no acute GYN intervention   - management of urinary complaints per ED    seen and d/w Dr. Francisco Partida PGY2

## 2022-02-28 NOTE — ED ADULT NURSE REASSESSMENT NOTE - NS ED NURSE REASSESS COMMENT FT1
Patient d/c. Reviewed d/c paperwork with patients, all questions answered at this time. Patient verbalizes understanding. IV removed. Patient instructed to return to the ER for any worsening s/s including chest pain, SOB, fever, n/v/d. Patient alert and stable at time of d/c. Patient will follow up with her GYN on Wednesday at her already scheduled appointment, and will make a follow up appointment with GYN. Patient given the names of the lab testing that GYN recommended be done on an outpatient basis.

## 2022-03-01 LAB
CULTURE RESULTS: NO GROWTH — SIGNIFICANT CHANGE UP
SPECIMEN SOURCE: SIGNIFICANT CHANGE UP

## 2022-03-02 NOTE — ED POST DISCHARGE NOTE - RESULT SUMMARY
Pt and  called, they followed up w/ their Gyn-onc, earliest appt for surgery is 3 weeks from now,  called asking for OBGYN name and # they saw while in ED to see if they could schedule earlier appt elsewhere. Gave Dr. Honey Singh's office #. Pt and  appreciative. - Victorino Roman PA-C

## 2022-05-23 PROBLEM — Z00.00 ENCOUNTER FOR PREVENTIVE HEALTH EXAMINATION: Status: ACTIVE | Noted: 2022-05-23

## 2022-06-13 ENCOUNTER — APPOINTMENT (OUTPATIENT)
Dept: UROLOGY | Facility: HOSPITAL | Age: 52
End: 2022-06-13

## 2022-09-08 NOTE — PATIENT PROFILE ADULT. - AS SC BRADEN NUTRITION
Caller: Mary Jo    Relationship: [facility]    Best call back number: [phone]    Requested Prescriptions:   Requested Prescriptions     Pending Prescriptions Disp Refills   • atenolol (TENORMIN) 25 MG tablet 180 tablet 3     Sig: Take 2 tablets by mouth Daily.        Pharmacy where request should be sent: [facility] - [address] [phone] [facility] [phone]      Additional details provided by patient: WOULD LIKE A 90 DAY SUPPLY    Does the patient have less than a 3 day supply:  [] Yes  [x] No    Darnell Briggs   09/08/22 10:18 EDT          Cough for 2 weeks, no recent fever. Vomiting for 3 days Sana Rosenthal LPN on 12/22/2019 at 6:17 PM     (3) adequate

## 2022-12-13 NOTE — ED ADULT NURSE NOTE - NS ED NURSE DISCH DISPOSITION
Have You Had A Chemical Peel Before?: has had a previous peel
When Outside In The Sun, Do You...: mostly burns, rarely tans
Discharged

## 2024-04-29 NOTE — ED PROVIDER NOTE - PRINCIPAL DIAGNOSIS
The heart rate is 43 the blood pressure is 86/52 patient is asymptomatic states she feels fine. Will have them do heart rate and blood pressure before medications and 1 hour after taking medications and give the office a call back to advise may need medication change. Suad verbalized understanding of all information given.   Flank pain

## 2024-10-06 ENCOUNTER — EMERGENCY (EMERGENCY)
Facility: HOSPITAL | Age: 54
LOS: 1 days | Discharge: HOME | End: 2024-10-06
Attending: STUDENT IN AN ORGANIZED HEALTH CARE EDUCATION/TRAINING PROGRAM
Payer: COMMERCIAL

## 2024-10-06 VITALS
TEMPERATURE: 98 F | SYSTOLIC BLOOD PRESSURE: 122 MMHG | WEIGHT: 134.04 LBS | OXYGEN SATURATION: 100 % | HEIGHT: 67 IN | RESPIRATION RATE: 16 BRPM | DIASTOLIC BLOOD PRESSURE: 88 MMHG | HEART RATE: 72 BPM

## 2024-10-06 VITALS
HEART RATE: 52 BPM | SYSTOLIC BLOOD PRESSURE: 97 MMHG | TEMPERATURE: 98 F | RESPIRATION RATE: 18 BRPM | OXYGEN SATURATION: 100 % | DIASTOLIC BLOOD PRESSURE: 67 MMHG

## 2024-10-06 LAB
ALBUMIN SERPL ELPH-MCNC: 4.4 G/DL — SIGNIFICANT CHANGE UP (ref 3.3–5)
ALP SERPL-CCNC: 84 U/L — SIGNIFICANT CHANGE UP (ref 40–120)
ALT FLD-CCNC: 16 U/L — SIGNIFICANT CHANGE UP (ref 10–45)
ANION GAP SERPL CALC-SCNC: 14 MMOL/L — SIGNIFICANT CHANGE UP (ref 5–17)
APTT BLD: 35.1 SEC — SIGNIFICANT CHANGE UP (ref 24.5–35.6)
AST SERPL-CCNC: 45 U/L — HIGH (ref 10–40)
BASOPHILS # BLD AUTO: 0.01 K/UL — SIGNIFICANT CHANGE UP (ref 0–0.2)
BASOPHILS NFR BLD AUTO: 0.3 % — SIGNIFICANT CHANGE UP (ref 0–2)
BILIRUB SERPL-MCNC: 0.5 MG/DL — SIGNIFICANT CHANGE UP (ref 0.2–1.2)
BUN SERPL-MCNC: 15 MG/DL — SIGNIFICANT CHANGE UP (ref 7–23)
CALCIUM SERPL-MCNC: 10.3 MG/DL — SIGNIFICANT CHANGE UP (ref 8.4–10.5)
CHLORIDE SERPL-SCNC: 103 MMOL/L — SIGNIFICANT CHANGE UP (ref 96–108)
CO2 SERPL-SCNC: 23 MMOL/L — SIGNIFICANT CHANGE UP (ref 22–31)
CREAT SERPL-MCNC: 0.67 MG/DL — SIGNIFICANT CHANGE UP (ref 0.5–1.3)
CRP SERPL-MCNC: <3 MG/L — SIGNIFICANT CHANGE UP (ref 0–4)
EGFR: 105 ML/MIN/1.73M2 — SIGNIFICANT CHANGE UP
EOSINOPHIL # BLD AUTO: 0.12 K/UL — SIGNIFICANT CHANGE UP (ref 0–0.5)
EOSINOPHIL NFR BLD AUTO: 3.2 % — SIGNIFICANT CHANGE UP (ref 0–6)
ERYTHROCYTE [SEDIMENTATION RATE] IN BLOOD: 20 MM/HR — SIGNIFICANT CHANGE UP (ref 0–20)
GAS PNL BLDV: SIGNIFICANT CHANGE UP
GLUCOSE SERPL-MCNC: 91 MG/DL — SIGNIFICANT CHANGE UP (ref 70–99)
HCT VFR BLD CALC: 37.4 % — SIGNIFICANT CHANGE UP (ref 34.5–45)
HGB BLD-MCNC: 12.4 G/DL — SIGNIFICANT CHANGE UP (ref 11.5–15.5)
INR BLD: 1.1 RATIO — SIGNIFICANT CHANGE UP (ref 0.85–1.16)
LYMPHOCYTES # BLD AUTO: 1.82 K/UL — SIGNIFICANT CHANGE UP (ref 1–3.3)
LYMPHOCYTES # BLD AUTO: 48 % — HIGH (ref 13–44)
MCHC RBC-ENTMCNC: 28.7 PG — SIGNIFICANT CHANGE UP (ref 27–34)
MCHC RBC-ENTMCNC: 33.2 GM/DL — SIGNIFICANT CHANGE UP (ref 32–36)
MCV RBC AUTO: 86.6 FL — SIGNIFICANT CHANGE UP (ref 80–100)
MONOCYTES # BLD AUTO: 0.21 K/UL — SIGNIFICANT CHANGE UP (ref 0–0.9)
MONOCYTES NFR BLD AUTO: 5.5 % — SIGNIFICANT CHANGE UP (ref 2–14)
NEUTROPHILS # BLD AUTO: 1.63 K/UL — LOW (ref 1.8–7.4)
NEUTROPHILS NFR BLD AUTO: 43 % — SIGNIFICANT CHANGE UP (ref 43–77)
NRBC # BLD: 0 /100 WBCS — SIGNIFICANT CHANGE UP (ref 0–0)
PLATELET # BLD AUTO: SIGNIFICANT CHANGE UP K/UL (ref 150–400)
POTASSIUM SERPL-MCNC: 5.3 MMOL/L — SIGNIFICANT CHANGE UP (ref 3.5–5.3)
POTASSIUM SERPL-SCNC: 5.3 MMOL/L — SIGNIFICANT CHANGE UP (ref 3.5–5.3)
PROT SERPL-MCNC: 7.8 G/DL — SIGNIFICANT CHANGE UP (ref 6–8.3)
PROTHROM AB SERPL-ACNC: 12.6 SEC — SIGNIFICANT CHANGE UP (ref 9.9–13.4)
RBC # BLD: 4.32 M/UL — SIGNIFICANT CHANGE UP (ref 3.8–5.2)
RBC # FLD: 13 % — SIGNIFICANT CHANGE UP (ref 10.3–14.5)
SODIUM SERPL-SCNC: 140 MMOL/L — SIGNIFICANT CHANGE UP (ref 135–145)
WBC # BLD: 3.79 K/UL — LOW (ref 3.8–10.5)
WBC # FLD AUTO: 3.79 K/UL — LOW (ref 3.8–10.5)

## 2024-10-06 PROCEDURE — 82947 ASSAY GLUCOSE BLOOD QUANT: CPT

## 2024-10-06 PROCEDURE — 82330 ASSAY OF CALCIUM: CPT

## 2024-10-06 PROCEDURE — 93971 EXTREMITY STUDY: CPT | Mod: 26,RT

## 2024-10-06 PROCEDURE — 84295 ASSAY OF SERUM SODIUM: CPT

## 2024-10-06 PROCEDURE — 85652 RBC SED RATE AUTOMATED: CPT

## 2024-10-06 PROCEDURE — 82435 ASSAY OF BLOOD CHLORIDE: CPT

## 2024-10-06 PROCEDURE — 76882 US LMTD JT/FCL EVL NVASC XTR: CPT | Mod: 26,59,RT

## 2024-10-06 PROCEDURE — 99285 EMERGENCY DEPT VISIT HI MDM: CPT | Mod: 25

## 2024-10-06 PROCEDURE — 96365 THER/PROPH/DIAG IV INF INIT: CPT

## 2024-10-06 PROCEDURE — 86140 C-REACTIVE PROTEIN: CPT

## 2024-10-06 PROCEDURE — 87040 BLOOD CULTURE FOR BACTERIA: CPT

## 2024-10-06 PROCEDURE — 84132 ASSAY OF SERUM POTASSIUM: CPT

## 2024-10-06 PROCEDURE — 93971 EXTREMITY STUDY: CPT

## 2024-10-06 PROCEDURE — 76882 US LMTD JT/FCL EVL NVASC XTR: CPT

## 2024-10-06 PROCEDURE — 85014 HEMATOCRIT: CPT

## 2024-10-06 PROCEDURE — 85018 HEMOGLOBIN: CPT

## 2024-10-06 PROCEDURE — 85610 PROTHROMBIN TIME: CPT

## 2024-10-06 PROCEDURE — 80053 COMPREHEN METABOLIC PANEL: CPT

## 2024-10-06 PROCEDURE — 82803 BLOOD GASES ANY COMBINATION: CPT

## 2024-10-06 PROCEDURE — 85025 COMPLETE CBC W/AUTO DIFF WBC: CPT

## 2024-10-06 PROCEDURE — 36415 COLL VENOUS BLD VENIPUNCTURE: CPT

## 2024-10-06 PROCEDURE — 83605 ASSAY OF LACTIC ACID: CPT

## 2024-10-06 PROCEDURE — 85730 THROMBOPLASTIN TIME PARTIAL: CPT

## 2024-10-06 RX ORDER — DIPHENHYDRAMINE HCL 12.5MG/5ML
50 LIQUID (ML) ORAL ONCE
Refills: 0 | Status: COMPLETED | OUTPATIENT
Start: 2024-10-06 | End: 2024-10-06

## 2024-10-06 RX ORDER — CEPHALEXIN 500 MG
1 CAPSULE ORAL
Qty: 28 | Refills: 0
Start: 2024-10-06 | End: 2024-10-12

## 2024-10-06 RX ORDER — AMPICILLIN, SULBACTAM 250; 125 MG/ML; MG/ML
INJECTION, POWDER, FOR SOLUTION INTRAMUSCULAR; INTRAVENOUS
Refills: 0 | Status: DISCONTINUED | OUTPATIENT
Start: 2024-10-06 | End: 2024-10-06

## 2024-10-06 RX ORDER — CEFAZOLIN SODIUM 1 G
1000 VIAL (EA) INJECTION ONCE
Refills: 0 | Status: COMPLETED | OUTPATIENT
Start: 2024-10-06 | End: 2024-10-06

## 2024-10-06 RX ORDER — SODIUM CHLORIDE 0.9 % (FLUSH) 0.9 %
1000 SYRINGE (ML) INJECTION ONCE
Refills: 0 | Status: COMPLETED | OUTPATIENT
Start: 2024-10-06 | End: 2024-10-06

## 2024-10-06 RX ADMIN — Medication 1000 MILLIGRAM(S): at 07:00

## 2024-10-06 RX ADMIN — Medication 1000 MILLILITER(S): at 06:44

## 2024-10-06 RX ADMIN — Medication 1000 MILLILITER(S): at 07:00

## 2024-10-06 RX ADMIN — Medication 100 MILLIGRAM(S): at 06:44

## 2024-10-06 RX ADMIN — Medication 50 MILLIGRAM(S): at 06:55

## 2024-10-06 NOTE — ED PROVIDER NOTE - ATTENDING CONTRIBUTION TO CARE
Damien Mckeon DO: I have personally performed a face to face medical and diagnostic evaluation of the patient. I have discussed with and reviewed the Resident's and/or ACP's and/or Medical/PA/NP student's note and agree with the History, ROS, Physical Exam and MDM unless otherwise indicated. A brief summary of my personal evaluation and impression can be found below.     HPI above    Patient has area of cellulitis CONSTITUTIONAL: NAD  SKIN: Area approximately 6 to 7 inches across the lateral right lower extremity of exquisite tenderness erythema induration no palpable fluctuance.  No visible foreign body or retained stinger.  At this timeHEAD: NCAT  EYES: EOMI, PERRLA, no scleral icterus, conjunctiva pink  NECK: Supple; non tender. Full ROM.  CARD: RRR  RESP: No respiratory distress  ABD: non-distended, no abdominal tenderness  MSK: Full ROM, no leg swelling  PSYCH: Cooperative, appropriate.    Patient has likely infected insect sting patient is not meeting SIRS or sepsis criteria at this time and no evidence of systemic spread however is having chills and other constitutional signs, will give IV antibiotics, will likely need to stay in CDU versus admission for IV antibiotics given extent as well as constitutional symptoms.  Will get ultrasound to evaluate for abscess or drainable collection.  No indication for CT imaging at this time but consider if patient's pain severe gets worse.  No predisposing immunocompromise status or common medical conditions concerning for necrotizing infection at this time.  No prior history of bug bites fever or infectious etiology over hypersensitivity.

## 2024-10-06 NOTE — ED PROVIDER NOTE - NSFOLLOWUPINSTRUCTIONS_ED_ALL_ED_FT
Please make sure to follow up with your primary care doctor within 1-2 days.   Bring a copy of all of your results with you to your follow up appointments.   Return to the ER as discussed if you develop any new or worsening symptoms.     Take the antibiotics as prescribed. Make sure to complete the entire course as directed.     You may take Tylenol 1000mg every 6 hours as needed for pain and/or Ibuprofen 400mg every 6-8 hours as needed for pain. Take Ibuprofen with food.

## 2024-10-06 NOTE — ED ADULT NURSE NOTE - OBJECTIVE STATEMENT
54YOF no hx BIB self with  c/o RLE swelling/pain/chills. Pt reports being stung by a bee 3 days ago on her R lateral thigh, initially had a small erythematous area that has progressively become more swollen and tender, states that since yesterday has been feeling chills without a fever, fatigue/lethargy, and 'electric-like' waves of pain from thigh down to foot. This morning around 5AM woke up having chest palpitations and nausea, difficulty bearing weight on RLE. Denies any allergies, no medication use, no recent travel. On arrival AOx4, breathing unlabored, pulses strong x4, large erythematous circular area on R lateral thigh tender to palpation. Rash noted to R lower abdomen, not itchy or painful, unknown duration. Denies chest pain/SOB/nausea/vomiting/fever/dizziness. VSS.

## 2024-10-06 NOTE — ED ADULT NURSE NOTE - FINAL NURSING ELECTRONIC SIGNATURE
Pt is calling wants to go ahead with seeds, needs to see Dr Lopez in [place],   Need an order please   
Radiation oncology awre and will reach out to pt   
Service to Radiation oncology/Dr. John boo  Will forward to PSR to assist with scheduling.   
06-Oct-2024 09:48

## 2024-10-06 NOTE — ED PROVIDER NOTE - OBJECTIVE STATEMENT
53 y/o female no pmh presents with rash. She was stung by a bee 3 days ago, has slowly been developing a rash surrounding site of sting that is erythematous + painful. She endorses subjective chills, fever, nausea, as well as pain radiating down leg and to hip. She also noticed a small rash on her right lower abdomen. She has not taken any medications, no chest pain, SOB, wheezing, dysuria/hematuria.

## 2024-10-06 NOTE — ED PROVIDER NOTE - PROGRESS NOTE DETAILS
Pt reassessed, erythema within demarcated lines from earlier on right posterior thigh. Shared decision making with pt, offered CDU for IV abx vs trial of PO abx and d/c. Pt states she would like to be discharged and take oral medications. Strict return precautions reviewed with pt and pts  who is at bedside. Copy of results given. Patient stable for discharge.  Follow up instructions given, return to ED precautions reviewed. Importance of follow up emphasized, patient verbalized understanding.  All questions answered. Janneth Noriega PA-C

## 2024-10-06 NOTE — ED PROVIDER NOTE - PHYSICAL EXAMINATION
General: Alert and Orientated x 3. No apparent distress.  Head: Normocephalic and atraumatic.  Eyes: PERRLA with EOMI.   ENT: MMM  Neck: Supple.   Cardiac: Normal S1 and S2 w/ RRR. No murmurs appreciated.   Pulmonary: CTA bilaterally. No increased WOB.   Abdominal: Soft, non-tender, non-distended  Neurologic: No focal sensory or motor deficits.  Extremities: Large patch erythema right posterior knee/thigh with visible skin break from skin in center. Very painful to touch.   Skin: Color appropriate for race. Intact, warm, and well-perfused.  Psychiatric: Appropriate mood and affect. No apparent risk to self or others.

## 2024-10-06 NOTE — ED PROVIDER NOTE - CLINICAL SUMMARY MEDICAL DECISION MAKING FREE TEXT BOX
55 y/o female no pmh presents with rash. She was stung by a bee 3 days ago, has slowly been developing a rash surrounding site of sting that is erythematous + painful. She is hemodynamically stable, afebrile, on exam there is a large patch erythema right posterior knee/thigh with visible skin break from skin in center. Very painful to touch. No purulence. Will get labs, inflammatory markers, cultures, IV abx, dvt US. If workup unremarkable for systemic infection will d/c with oral abx.

## 2024-10-06 NOTE — ED ADULT NURSE REASSESSMENT NOTE - NS ED NURSE REASSESS COMMENT FT1
Patient d/c. Reviewed d/c paperwork with patients, all questions answered at this time. Patient verbalizes understanding. IV removed. Patient instructed to return to the ER for any worsening s/s including chest pain, SOB, fever, n/v/d. Patient alert and stable at time of d/c. Patient educated on dc medications and will follow up with her PCP.

## 2024-10-06 NOTE — ED PROVIDER NOTE - PATIENT PORTAL LINK FT
You can access the FollowMyHealth Patient Portal offered by [facility] by registering at the following website: [email or web address]. By joining [employer]’s FollowMyHealth portal, you will also be able to view your health information using other applications (apps) compatible with our system.

## 2024-11-12 NOTE — PATIENT PROFILE ADULT. - HEALTH/HEALTHCARE ANXIETIES, PROFILE
Patient: Becky Mejia    Procedure Summary     Date:  05/10/19 Room / Location:   [facility] OR 03 /  [facility] OR    Anesthesia Start:  0800 Anesthesia Stop:  1247    Procedure:  BILATERAL BREAST REDUCTION (Bilateral Chest) Diagnosis:  (MACROMASTIA)    Surgeon:  Chi Guerrier MD Provider:  Richie Villanueva CRNA    Anesthesia Type:  general ASA Status:  1          Anesthesia Type: general  Last vitals  BP   131/69 (05/10/19 1531)   Temp   98.6 °F (37 °C) (05/10/19 1514)   Pulse   99 (05/10/19 1531)   Resp   18 (05/10/19 1531)     SpO2   93 % (05/10/19 1531)     Post Anesthesia Care and Evaluation    Patient location during evaluation: PACU  Patient participation: complete - patient participated  Level of consciousness: awake and alert  Pain management: adequate  Airway patency: patent  Anesthetic complications: No anesthetic complications  PONV Status: controlled  Cardiovascular status: acceptable and hemodynamically stable  Respiratory status: acceptable  Hydration status: acceptable    Comments: Patient discharged from PACU prior to anesthesia evaluation based on Ashly Score.  For details, see RN note.     /69 (BP Location: Right arm, Patient Position: Lying)   Pulse 99   Temp 98.6 °F (37 °C) (Temporal)   Resp 18   LMP 05/01/2019 (Exact Date) Comment: neg hcg  SpO2 93%   Breastfeeding? No        PAST SURGICAL HISTORY:  No significant past surgical history      none